# Patient Record
Sex: MALE | Employment: FULL TIME | URBAN - METROPOLITAN AREA
[De-identification: names, ages, dates, MRNs, and addresses within clinical notes are randomized per-mention and may not be internally consistent; named-entity substitution may affect disease eponyms.]

---

## 2017-08-02 ENCOUNTER — OFFICE VISIT (OUTPATIENT)
Dept: INTERNAL MEDICINE CLINIC | Age: 27
End: 2017-08-02

## 2017-08-02 VITALS
BODY MASS INDEX: 23.84 KG/M2 | OXYGEN SATURATION: 98 % | TEMPERATURE: 97.7 F | WEIGHT: 176 LBS | HEART RATE: 71 BPM | SYSTOLIC BLOOD PRESSURE: 120 MMHG | DIASTOLIC BLOOD PRESSURE: 76 MMHG | RESPIRATION RATE: 18 BRPM | HEIGHT: 72 IN

## 2017-08-02 DIAGNOSIS — R03.0 ELEVATED BLOOD PRESSURE READING: Primary | ICD-10-CM

## 2017-08-02 NOTE — PROGRESS NOTES
HISTORY OF PRESENT ILLNESS  Luana Mojica is a 27 y.o. male. Patient reports recent elevated blood pressure reading at dentist office. His blood pressure was 130s/90. He had a similar reading one other time at a doctor's office visit. Denies headache, blurred vision, or lightheadedness. He has not checked his blood pressure outside of the office. Visit Vitals    /76 (BP 1 Location: Left arm, BP Patient Position: Sitting)    Pulse 71    Temp 97.7 °F (36.5 °C) (Oral)    Resp 18    Ht 6' (1.829 m)    Wt 176 lb (79.8 kg)    SpO2 98%    BMI 23.87 kg/m2       Hypertension    The history is provided by the patient. This is a new problem. Episode onset: elevated reading at dentist. Pertinent negatives include no blurred vision, no headaches and no dizziness. Review of Systems   Eyes: Negative for blurred vision. Neurological: Negative for dizziness and headaches. Physical Exam   Constitutional: He is oriented to person, place, and time. He appears well-developed and well-nourished. HENT:   Head: Normocephalic. Neck: Normal range of motion. Neck supple. Cardiovascular: Normal rate, regular rhythm and normal heart sounds. Pulmonary/Chest: Effort normal and breath sounds normal.   Neurological: He is alert and oriented to person, place, and time. Skin: Skin is warm and dry. Psychiatric: He has a normal mood and affect. ASSESSMENT and PLAN    ICD-10-CM ICD-9-CM    1. Elevated blood pressure reading R03.0 796.2      No orders of the defined types were placed in this encounter.   monitor blood pressure at home a few times per month  Follow-up if blood pressure readings over 130/90

## 2017-08-02 NOTE — PROGRESS NOTES
Chief Complaint   Patient presents with    Hypertension     elevated BP        1. Have you been to the ER, urgent care clinic since your last visit? No  Hospitalized since your last visit? No    2. Have you seen or consulted any other health care providers outside of the [facility] since your last visit? Yes, Dentist  Include any pap smears or colon screening.  No

## 2018-05-25 ENCOUNTER — OFFICE VISIT (OUTPATIENT)
Dept: INTERNAL MEDICINE CLINIC | Age: 28
End: 2018-05-25

## 2018-05-25 VITALS
SYSTOLIC BLOOD PRESSURE: 106 MMHG | RESPIRATION RATE: 15 BRPM | WEIGHT: 170 LBS | TEMPERATURE: 98.5 F | OXYGEN SATURATION: 99 % | DIASTOLIC BLOOD PRESSURE: 68 MMHG | HEART RATE: 74 BPM | HEIGHT: 72 IN | BODY MASS INDEX: 23.03 KG/M2

## 2018-05-25 DIAGNOSIS — Z11.3 ROUTINE SCREENING FOR STI (SEXUALLY TRANSMITTED INFECTION): Primary | ICD-10-CM

## 2018-05-25 NOTE — PROGRESS NOTES
Subjective:      Radha Martinez is a 24 y.o. male who presents today requesting STI testing. He states that he has never been tested. No current symptoms or known exposure. He has had the same partner for the last year. Just wants to be tested. There is no problem list on file for this patient. Review of Systems    Pertinent items are noted in HPI. Objective:     Visit Vitals    /68 (BP 1 Location: Left arm, BP Patient Position: Sitting)    Pulse 74    Temp 98.5 °F (36.9 °C) (Oral)    Resp 15    Ht 6' (1.829 m)    Wt 170 lb (77.1 kg)    SpO2 99%    BMI 23.06 kg/m2     General appearance: alert, cooperative, no distress, appears stated age    Assessment/Plan:       ICD-10-CM ICD-9-CM    1. Routine screening for STI (sexually transmitted infection) Z11.3 V74.5 T PALLIDUM SCREEN W/REFLEX      HEPATITIS C AB      HEPATITIS B CORE AB, TOTAL      HEPATITIS B SURF AB QUANT      HIV 1/2 AG/AB, 4TH GENERATION,W RFLX CONFIRM      C TRACHOMATIS AMPLIFICATION      N GONORRHOEA AMPLIFICATION      Plan:  -labs ordered for STI screening. Follow-up Disposition:     Return if symptoms worsen or fail to improve. Advised patient to call back or return to office if symptoms worsen/change/persist.     Discussed expected course/resolution/complications of diagnosis in detail with patient. Medication risks/benefits/costs/interactions/alternatives discussed with patient. Patient was given an after visit summary which includes diagnoses, current medications, & vitals. Patient expressed understanding with the diagnosis and plan.

## 2018-05-25 NOTE — MR AVS SNAPSHOT
[address] [place] [address], [facility] [address] [place] [address] 
[phone] Patient: Devin Campos MRN: [id number] :1990 Visit Information Date & Time Provider Department Dept. Phone Encounter #  
 2018 12:00 PM Cosmo Habermann, MD [place] [address] [facility] [phone] [id number] Follow-up Instructions Return if symptoms worsen or fail to improve. Upcoming Health Maintenance Date Due DTaP/Tdap/Td series (1 - Tdap) 10/26/2013 Influenza Age 5 to Adult 2018 Allergies as of 2018  Review Complete On: 2018 By: Yen Crews No Known Allergies Current Immunizations  Never Reviewed No immunizations on file. Not reviewed this visit You Were Diagnosed With   
  
 Codes Comments Routine screening for STI (sexually transmitted infection)    -  Primary ICD-10-CM: Z11.3 ICD-9-CM: V74.5 Vitals BP Pulse Temp Resp Height(growth percentile) Weight(growth percentile) 106/68 (BP 1 Location: Left arm, BP Patient Position: Sitting) 74 98.5 °F (36.9 °C) (Oral) 15 6' (1.829 m) 170 lb (77.1 kg) SpO2 BMI Smoking Status 99% 23.06 kg/m2 Never Smoker Vitals History BMI and BSA Data Body Mass Index Body Surface Area 23.06 kg/m 2 1.98 m 2 Preferred Pharmacy Pharmacy Name Phone [id number] [place] [address] [facility] 1 [phone] Your Updated Medication List  
  
Notice  As of 2018 12:51 PM  
 You have not been prescribed any medications. We Performed the Following C TRACHOMATIS AMPLIFICATION [id number] CPT(R)] HEPATITIS B CORE AB, TOTAL U6382884 CPT(R)] HEPATITIS B SURF AB QUANT [id number] CPT(R)] HEPATITIS C AB [96202 CPT(R)] HIV 1/2 AG/AB, 4TH GENERATION,W RFLX CONFIRM [id number] CPT(R)]   
 N GONORRHOEA AMPLIFICATION [id number] CPT(R)] T PALLIDUM SCREEN W/REFLEX [[id number] Custom] Follow-up Instructions Return if symptoms worsen or fail to improve. Introducing [facility]! Dear Huber Reed: Thank you for requesting a [employer] account. Our records indicate that you already have an active [employer] account. You can access your account anytime at [email or web address] Did you know that you can access your hospital and ER discharge instructions at any time in [employer]? You can also review all of your test results from your hospital stay or ER visit. Additional Information If you have questions, please visit the Frequently Asked Questions section of the [employer] website at [email or web address] Remember, [employer] is NOT to be used for urgent needs. For medical emergencies, dial 911. Now available from your iPhone and Android! Please provide this summary of care documentation to your next provider. Your primary care clinician is listed as Anne Sharpe. If you have any questions after today's visit, please call [phone].

## 2018-05-25 NOTE — PROGRESS NOTES
Patient's identity verified with two patient identifiers (name and date of birth). Reviewed record in preparation for visit and have obtained necessary documentation. 1. Have you been to the ER, urgent care clinic since your last visit? Hospitalized since your last visit? No  2. Have you seen or consulted any other health care providers outside of the [address] [place] [address] since your last visit? Include any pap smears or colon screening. No    Chief Complaint   Patient presents with    Exposure to STD     Routine check, has never been tested before. Denies exposure or s/s of itching, burning, redness, drainage, lesions, or bumps. Sexually active w/ gf, who is on OCPs. Not fasting. Health Maintenance Due   Topic    DTaP/Tdap/Td series (1 - Tdap)  Has not had.      Wt Readings from Last 3 Encounters:   05/25/18 170 lb (77.1 kg)   08/02/17 176 lb (79.8 kg)   07/26/16 190 lb (86.2 kg)     Temp Readings from Last 3 Encounters:   05/25/18 98.5 °F (36.9 °C) (Oral)   08/02/17 97.7 °F (36.5 °C) (Oral)   06/20/16 99.2 °F (37.3 °C) (Oral)     BP Readings from Last 3 Encounters:   05/25/18 106/68   08/02/17 120/76   06/20/16 126/84     Pulse Readings from Last 3 Encounters:   05/25/18 74   08/02/17 71   06/20/16 62

## 2018-05-29 ENCOUNTER — PATIENT MESSAGE (OUTPATIENT)
Dept: INTERNAL MEDICINE CLINIC | Age: 28
End: 2018-05-29

## 2018-05-29 LAB
C TRACH RRNA SPEC QL NAA+PROBE: NEGATIVE
HBV CORE AB SERPL QL IA: NEGATIVE
HBV SURFACE AB SER-ACNC: 6.8 MIU/ML
HCV AB S/CO SERPL IA: <0.1 S/CO RATIO (ref 0–0.9)
HIV 1+2 AB+HIV1 P24 AG SERPL QL IA: NON REACTIVE
N GONORRHOEA RRNA SPEC QL NAA+PROBE: NEGATIVE
T PALLIDUM AB SER QL IA: NEGATIVE

## 2019-01-11 ENCOUNTER — TELEPHONE (OUTPATIENT)
Dept: INTERNAL MEDICINE CLINIC | Age: 29
End: 2019-01-11

## 2019-01-11 NOTE — TELEPHONE ENCOUNTER
Patient would like to know the name of an allergist that the practice refers to. Please call him back at [phone]

## 2019-01-14 NOTE — TELEPHONE ENCOUNTER
Pt call today again requesting a  Referral for an allergist pt has an appt with dr gonzales on [phone]

## 2019-01-14 NOTE — TELEPHONE ENCOUNTER
Left voicemail message for patient with the following information     [facility]    Phone: [phone]